# Patient Record
Sex: MALE | Race: WHITE | NOT HISPANIC OR LATINO | Employment: FULL TIME | URBAN - METROPOLITAN AREA
[De-identification: names, ages, dates, MRNs, and addresses within clinical notes are randomized per-mention and may not be internally consistent; named-entity substitution may affect disease eponyms.]

---

## 2018-06-07 DIAGNOSIS — R21 RASH AND NONSPECIFIC SKIN ERUPTION: ICD-10-CM

## 2018-06-07 RX ORDER — CLOTRIMAZOLE AND BETAMETHASONE DIPROPIONATE 10; .64 MG/G; MG/G
1 CREAM TOPICAL 2 TIMES DAILY
Qty: 1 TUBE | Refills: 0 | Status: SHIPPED | OUTPATIENT
Start: 2018-06-07

## 2018-06-07 NOTE — TELEPHONE ENCOUNTER
Was the patient seen in the last year in this department? No     Does patient have an active prescription for medications requested? No     Received Request Via: Patient     Reviewed with Dr. Tan. Refill ok, but pt must come in for follow up. Pt would like to stay with Dr. Tan at [facility]. Request for info be emailed. Will call back to schedule.

## 2022-06-30 ENCOUNTER — TELEPHONE (OUTPATIENT)
Dept: SCHEDULING | Facility: IMAGING CENTER | Age: 57
End: 2022-06-30

## 2022-07-11 ENCOUNTER — OFFICE VISIT (OUTPATIENT)
Dept: MEDICAL GROUP | Facility: PHYSICIAN GROUP | Age: 57
End: 2022-07-11
Payer: COMMERCIAL

## 2022-07-11 VITALS
WEIGHT: 163.6 LBS | BODY MASS INDEX: 23.42 KG/M2 | TEMPERATURE: 97.6 F | RESPIRATION RATE: 16 BRPM | HEIGHT: 70 IN | SYSTOLIC BLOOD PRESSURE: 133 MMHG | OXYGEN SATURATION: 96 % | HEART RATE: 68 BPM | DIASTOLIC BLOOD PRESSURE: 88 MMHG

## 2022-07-11 DIAGNOSIS — Z85.51 HX OF BLADDER CANCER: ICD-10-CM

## 2022-07-11 DIAGNOSIS — E55.9 VITAMIN D DEFICIENCY: ICD-10-CM

## 2022-07-11 DIAGNOSIS — Z00.00 PHYSICAL EXAM, ANNUAL: ICD-10-CM

## 2022-07-11 DIAGNOSIS — Z12.11 SCREENING FOR COLON CANCER: ICD-10-CM

## 2022-07-11 PROCEDURE — 99203 OFFICE O/P NEW LOW 30 MIN: CPT | Performed by: PHYSICIAN ASSISTANT

## 2022-07-11 RX ORDER — CHLORAL HYDRATE 500 MG
1000 CAPSULE ORAL
COMMUNITY

## 2022-07-11 ASSESSMENT — PATIENT HEALTH QUESTIONNAIRE - PHQ9: CLINICAL INTERPRETATION OF PHQ2 SCORE: 0

## 2022-07-11 NOTE — PROGRESS NOTES
CC:    Chief Complaint   Patient presents with   • Establish Care       HISTORY OF THE PRESENT ILLNESS: Patient is a 57 y.o. male presenting to establish primary care     1. Pt has hx of bladder CA. In remission. Still followed by urology.   2. Last colon CA screening was 6 years ago, FIT test.       No problem-specific Assessment & Plan notes found for this encounter.    Allergies: Patient has no known allergies.    Current Outpatient Medications Ordered in Epic   Medication Sig Dispense Refill   • Omega-3 Fatty Acids (FISH OIL) 1000 MG Cap capsule Take 1,000 mg by mouth 3 times a day with meals.     • Multiple Vitamin (MULTIVITAMIN ADULT PO) Take  by mouth.     • Cholecalciferol (VITAMIN D3 PO) Take  by mouth.     • clotrimazole-betamethasone (LOTRISONE) 1-0.05 % Cream Apply 1 Application to affected area(s) 2 times a day. (Patient not taking: Reported on 7/11/2022) 1 Tube 0     No current Epic-ordered facility-administered medications on file.       Past Medical History:   Diagnosis Date   • Cancer (HCC)     bladder- papillary urothelial ca       Past Surgical History:   Procedure Laterality Date   • CYSTOSCOPY  5/24/2016    Procedure: CYSTOSCOPY;  Surgeon: Nic Yo M.D.;  Location: SURGERY [facility];  Service:    • TRANS URETHRAL RESECTION BLADDER  5/24/2016    Procedure: TRANS URETHRAL RESECTION BLADDER BIPOLAR;  Surgeon: Nic Yo M.D.;  Location: SURGERY [facility];  Service:        Social History     Tobacco Use   • Smoking status: Never Smoker   • Smokeless tobacco: Never Used   Vaping Use   • Vaping Use: Never used   Substance Use Topics   • Alcohol use: Not Currently     Alcohol/week: 1.2 oz     Types: 2 Glasses of wine per week     Comment: OCC   • Drug use: No       Social History     Social History Narrative    Moved to [place] from [facility] -     He was born and raised in [place].      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  Played baseball in high school.   He's noticing that he's slowing down in terms of his physical activity..  Exercises 5-6 days per week.  Goes on lots of walks.    Has a dog.  Active at work.     Diet - he eats granola.  1 cup of coffee a day.  He likes OJ.  Eats more carbs - pasta and rice.  Chicken, on occasion red meats.  Salads and Carrots.  Tries to stay away sweets.      Etoh - glass or 2 of wine for dinner.  1-2 beers.  Not every day.       Family History   Problem Relation Age of Onset   • Diabetes Neg Hx    • Heart Disease Neg Hx    • Cancer Mother         leukemia   • Other Mother         cirrhosis of the liver   • Cancer Paternal Grandfather         pancreatic        ROS:     - Constitutional: Negative for fever, chills, unexpected weight change, and fatigue/generalized weakness.     - HEENT: Negative for headaches, vision changes, hearing changes, ear pain, ear discharge, rhinorrhea, sinus congestion, sore throat, and neck pain.      - Respiratory: Negative for cough, sputum production, chest congestion, dyspnea, wheezing, and crackles.      - Cardiovascular: Negative for chest pain, palpitations, orthopnea, and bilateral lower extremity edema.     - Gastrointestinal: Negative for heartburn, nausea, vomiting, abdominal pain, hematochezia, melena, diarrhea, constipation, and greasy/foul-smelling stools.     - Genitourinary: Negative for dysuria, polyuria, hematuria, pyuria, urinary urgency, and urinary incontinence.     - Musculoskeletal: Negative for myalgias, back pain, and joint pain.     - Skin: Negative for rash, itching, cyanotic skin color change.     - Neurological: Negative for dizziness, tingling, tremors, focal sensory deficit, focal weakness and headaches.     - Endo/Heme/Allergies: Does not bruise/bleed easily.     - Psychiatric/Behavioral: Negative for depression, suicidal/homicidal ideation and memory loss.            .      Exam: /88   Pulse 68   Temp  "36.4 °C (97.6 °F) (Temporal)   Resp 16   Ht 1.778 m (5' 10\")   Wt 74.2 kg (163 lb 9.6 oz)   SpO2 96%  Body mass index is 23.47 kg/m².    General: Normal appearing. No acute distress.  Skin: Warm and dry.  No obvious lesions.  HEENT: Normocephalic. Eyes conjunctiva clear lids without ptosis, ears normal shape and contour  Cardiovascular: Regular rate and rhythm without murmur.   Respiratory: Clear to auscultation bilaterally, no rhonchi wheezing or rales.  Neurologic: Grossly nonfocal, A&O x3, gait normal,  Musculoskeletal: No deformity or swelling.   Extremities: No extremity cyanosis, clubbing, or edema.  Psych: Normal mood and affect. Judgment and insight is normal.    Please note that this dictation was created using voice recognition software. I have made every reasonable attempt to correct obvious errors, but I expect that there are errors of grammar and possibly content that I did not discover before finalizing the note.      Assessment/Plan  1. Screening for colon cancer    - COLOGUARD (FIT DNA)    2. Physical exam, annual  Labs printed.   - CBC WITH DIFFERENTIAL; Future  - Comp Metabolic Panel; Future  - HEMOGLOBIN A1C; Future  - Lipid Profile; Future  - TSH WITH REFLEX TO FT4; Future    3. Hx of bladder cancer  Followed by urology    4. Vitamin D deficiency  Continue with Vit D     "

## 2022-08-01 LAB — HBA1C MFR BLD: 5.5 % (ref 0–5.6)

## 2022-08-15 ENCOUNTER — OFFICE VISIT (OUTPATIENT)
Dept: MEDICAL GROUP | Facility: PHYSICIAN GROUP | Age: 57
End: 2022-08-15
Payer: COMMERCIAL

## 2022-08-15 VITALS
HEIGHT: 70 IN | WEIGHT: 161.8 LBS | SYSTOLIC BLOOD PRESSURE: 128 MMHG | DIASTOLIC BLOOD PRESSURE: 78 MMHG | TEMPERATURE: 97.1 F | OXYGEN SATURATION: 96 % | RESPIRATION RATE: 16 BRPM | HEART RATE: 65 BPM | BODY MASS INDEX: 23.16 KG/M2

## 2022-08-15 DIAGNOSIS — Z00.00 PHYSICAL EXAM, ANNUAL: ICD-10-CM

## 2022-08-15 DIAGNOSIS — B35.4 TINEA CORPORIS: ICD-10-CM

## 2022-08-15 PROCEDURE — 99396 PREV VISIT EST AGE 40-64: CPT | Performed by: PHYSICIAN ASSISTANT

## 2022-08-15 RX ORDER — KETOCONAZOLE 20 MG/G
CREAM TOPICAL
Qty: 15 G | Refills: 0 | Status: SHIPPED | OUTPATIENT
Start: 2022-08-15 | End: 2023-04-24

## 2022-08-15 NOTE — PROGRESS NOTES
CC:    Chief Complaint   Patient presents with    Lab Results       HISTORY OF THE PRESENT ILLNESS:  57 y.o. male presenting for annual physical exam.   Pt's A1C at 5.5%.  Pt has rash over R lower leg for a few weeks.       No problem-specific Assessment & Plan notes found for this encounter.    Allergies: Patient has no known allergies.    Current Outpatient Medications Ordered in Epic   Medication Sig Dispense Refill    Omega-3 Fatty Acids (FISH OIL) 1000 MG Cap capsule Take 1,000 mg by mouth 3 times a day with meals.      Multiple Vitamin (MULTIVITAMIN ADULT PO) Take  by mouth.      Cholecalciferol (VITAMIN D3 PO) Take  by mouth.      clotrimazole-betamethasone (LOTRISONE) 1-0.05 % Cream Apply 1 Application to affected area(s) 2 times a day. (Patient not taking: No sig reported) 1 Tube 0     No current Epic-ordered facility-administered medications on file.       Past Medical History:   Diagnosis Date    Cancer (HCC)     bladder- papillary urothelial ca       Past Surgical History:   Procedure Laterality Date    CYSTOSCOPY  5/24/2016    Procedure: CYSTOSCOPY;  Surgeon: Nic Yo M.D.;  Location: SURGERY [facility];  Service:     TRANS URETHRAL RESECTION BLADDER  5/24/2016    Procedure: TRANS URETHRAL RESECTION BLADDER BIPOLAR;  Surgeon: Nic Yo M.D.;  Location: SURGERY [facility];  Service:        Social History     Tobacco Use    Smoking status: Never    Smokeless tobacco: Never   Vaping Use    Vaping Use: Never used   Substance Use Topics    Alcohol use: Not Currently     Alcohol/week: 1.2 oz     Types: 2 Glasses of wine per week     Comment: OCC    Drug use: No       Social History     Social History Narrative    Moved to [place] from [facility] -     He was born and raised in [place].      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  Played baseball in high school.   He's noticing that he's slowing down in terms  of his physical activity..  Exercises 5-6 days per week.  Goes on lots of walks.    Has a dog.  Active at work.     Diet - he eats granola.  1 cup of coffee a day.  He likes OJ.  Eats more carbs - pasta and rice.  Chicken, on occasion red meats.  Salads and Carrots.  Tries to stay away sweets.      Etoh - glass or 2 of wine for dinner.  1-2 beers.  Not every day.       Family History   Problem Relation Age of Onset    Cancer Mother         leukemia    Other Mother         cirrhosis of the liver    Cancer Paternal Grandfather         pancreatic     Diabetes Neg Hx     Heart Disease Neg Hx        ROS:     - Constitutional: Negative for fever, chills, unexpected weight change, and fatigue/generalized weakness.     - HEENT: Negative for headaches, vision changes, hearing changes, ear pain, ear discharge, rhinorrhea, sinus congestion, sore throat, and neck pain.      - Respiratory: Negative for cough, sputum production, chest congestion, dyspnea, wheezing, and crackles.      - Cardiovascular: Negative for chest pain, palpitations, orthopnea, and bilateral lower extremity edema.     - Gastrointestinal: Negative for heartburn, nausea, vomiting, abdominal pain, hematochezia, melena, diarrhea, constipation, and greasy/foul-smelling stools.     - Genitourinary: Negative for dysuria, polyuria, hematuria, pyuria, urinary urgency, and urinary incontinence.     - Musculoskeletal: Negative for myalgias, back pain, and joint pain.     - Skin:Positive for rash.  Negative for itching, cyanotic skin color change.     - Neurological: Negative for dizziness, tingling, tremors, focal sensory deficit, focal weakness and headaches.     - Endo/Heme/Allergies: Does not bruise/bleed easily.     - Psychiatric/Behavioral: Negative for depression, suicidal/homicidal ideation and memory loss.            Health Maintenance  Cholesterol Screening: Fasting lipid panel  Diabetes Screening: Fasting blood sugar  Substance Abuse: None  Safe in  "relationship Yes     Cancer screening  Colorectal Cancer Screening:  July 2022        Exam: /78   Pulse 65   Temp 36.2 °C (97.1 °F) (Temporal)   Resp 16   Ht 1.778 m (5' 10\")   Wt 73.4 kg (161 lb 12.8 oz)   SpO2 96%  Body mass index is 23.22 kg/m².    General: Normal appearing. No distress.  HEENT: Normocephalic. Eyes conjunctiva clear lids without ptosis, pupils equal and reactive to light accommodation, ears normal shape and contour, canals are clear bilaterally, tympanic membranes are benign, nasal mucosa benign, oropharynx is without erythema, edema or exudates.   Neck: Supple without JVD or bruit. No thyromegaly. No cervical lymphadenopathy  Pulmonary: Clear to ausculation.  Normal effort. No rales, ronchi, or wheezing.  Cardiovascular: Regular rate and rhythm without murmur, gallops or rubs  Neurologic: Grossly nonfocal, gait normal, normal muscle tone  Skin: Warm and dry.  Positive for annular rash over R lower leg with raised borders and central clearing  Musculoskeletal: No extremity cyanosis, clubbing, or edema. No deformity  Psych: Normal mood and affect. Alert and oriented x3. Judgment and insight is normal.    Please note that this dictation was created using voice recognition software. I have made every reasonable attempt to correct obvious errors, but I expect that there are errors of grammar and possibly content that I did not discover before finalizing the note.      Assessment/Plan  1. Physical exam, annual  PE conducted.   - CBC WITH DIFFERENTIAL; Future  - Comp Metabolic Panel; Future  - HEMOGLOBIN A1C; Future  - Lipid Profile; Future  - TSH WITH REFLEX TO FT4; Future    2. Tinea corporis  Return if not improving.   - ketoconazole (NIZORAL) 2 % Cream; Apply pea sized amount to affected area once daily  Dispense: 15 g; Refill: 0    Return in about 1 year (around 8/15/2023) for pe.    "

## 2023-04-22 DIAGNOSIS — B35.4 TINEA CORPORIS: ICD-10-CM

## 2023-04-24 RX ORDER — KETOCONAZOLE 20 MG/G
CREAM TOPICAL
Qty: 15 G | Refills: 0 | Status: SHIPPED | OUTPATIENT
Start: 2023-04-24

## 2023-09-11 ENCOUNTER — OFFICE VISIT (OUTPATIENT)
Dept: MEDICAL GROUP | Facility: PHYSICIAN GROUP | Age: 58
End: 2023-09-11
Payer: COMMERCIAL

## 2023-09-11 VITALS
WEIGHT: 165 LBS | BODY MASS INDEX: 23.62 KG/M2 | SYSTOLIC BLOOD PRESSURE: 110 MMHG | RESPIRATION RATE: 12 BRPM | TEMPERATURE: 97.4 F | HEIGHT: 70 IN | OXYGEN SATURATION: 96 % | HEART RATE: 69 BPM | DIASTOLIC BLOOD PRESSURE: 68 MMHG

## 2023-09-11 DIAGNOSIS — Z00.00 PHYSICAL EXAM, ANNUAL: ICD-10-CM

## 2023-09-11 DIAGNOSIS — Z11.59 ENCOUNTER FOR HEPATITIS C SCREENING TEST FOR LOW RISK PATIENT: ICD-10-CM

## 2023-09-11 DIAGNOSIS — L30.9 DERMATITIS: ICD-10-CM

## 2023-09-11 PROCEDURE — 3074F SYST BP LT 130 MM HG: CPT | Performed by: PHYSICIAN ASSISTANT

## 2023-09-11 PROCEDURE — 99396 PREV VISIT EST AGE 40-64: CPT | Performed by: PHYSICIAN ASSISTANT

## 2023-09-11 PROCEDURE — 3078F DIAST BP <80 MM HG: CPT | Performed by: PHYSICIAN ASSISTANT

## 2023-09-11 RX ORDER — TRIAMCINOLONE ACETONIDE 1 MG/G
CREAM TOPICAL
Qty: 30 G | Refills: 0 | Status: SHIPPED | OUTPATIENT
Start: 2023-09-11

## 2023-09-11 ASSESSMENT — PATIENT HEALTH QUESTIONNAIRE - PHQ9: CLINICAL INTERPRETATION OF PHQ2 SCORE: 0

## 2023-09-11 NOTE — PROGRESS NOTES
CC:    Chief Complaint   Patient presents with    Annual Exam       HISTORY OF THE PRESENT ILLNESS:  58 y.o. male presenting for annual physical exam.   Has an itchy rash over his L lateral ankle that has been present for about a month. Tried using on antifungal Rx but did not improve.       No problem-specific Assessment & Plan notes found for this encounter.    Allergies: Patient has no known allergies.    Current Outpatient Medications Ordered in Epic   Medication Sig Dispense Refill    ketoconazole (NIZORAL) 2 % Cream APPLY PEA SIZED AMOUNT TO AFFECTED AREA ONCE DAILY 15 g 0    Omega-3 Fatty Acids (FISH OIL) 1000 MG Cap capsule Take 1,000 mg by mouth 3 times a day with meals.      Multiple Vitamin (MULTIVITAMIN ADULT PO) Take  by mouth.      Cholecalciferol (VITAMIN D3 PO) Take  by mouth.      clotrimazole-betamethasone (LOTRISONE) 1-0.05 % Cream Apply 1 Application to affected area(s) 2 times a day. (Patient not taking: Reported on 7/11/2022) 1 Tube 0     No current Epic-ordered facility-administered medications on file.       Past Medical History:   Diagnosis Date    Cancer (HCC)     bladder- papillary urothelial ca       Past Surgical History:   Procedure Laterality Date    CYSTOSCOPY  5/24/2016    Procedure: CYSTOSCOPY;  Surgeon: Nic Yo M.D.;  Location: SURGERY [facility];  Service:     TRANS URETHRAL RESECTION BLADDER  5/24/2016    Procedure: TRANS URETHRAL RESECTION BLADDER BIPOLAR;  Surgeon: Nic Yo M.D.;  Location: SURGERY [facility];  Service:        Social History     Tobacco Use    Smoking status: Never    Smokeless tobacco: Never   Vaping Use    Vaping Use: Never used   Substance Use Topics    Alcohol use: Not Currently     Alcohol/week: 1.2 oz     Types: 2 Glasses of wine per week     Comment: OCC    Drug use: No       Social History     Social History Narrative    Moved to [place] from [facility] -     He was born and raised in [place].      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  Played baseball in high school.   He's noticing that he's slowing down in terms of his physical activity..  Exercises 5-6 days per week.  Goes on lots of walks.    Has a dog.  Active at work.     Diet - he eats granola.  1 cup of coffee a day.  He likes OJ.  Eats more carbs - pasta and rice.  Chicken, on occasion red meats.  Salads and Carrots.  Tries to stay away sweets.      Etoh - glass or 2 of wine for dinner.  1-2 beers.  Not every day.       Family History   Problem Relation Age of Onset    Cancer Mother         leukemia    Other Mother         cirrhosis of the liver    Cancer Paternal Grandfather         pancreatic     Diabetes Neg Hx     Heart Disease Neg Hx        ROS:     - Constitutional: Negative for fever, chills, unexpected weight change, and fatigue/generalized weakness.     - HEENT: Negative for headaches, vision changes, hearing changes, ear pain, ear discharge, rhinorrhea, sinus congestion, sore throat, and neck pain.      - Respiratory: Negative for cough, sputum production, chest congestion, dyspnea, wheezing, and crackles.      - Cardiovascular: Negative for chest pain, palpitations, orthopnea, and bilateral lower extremity edema.     - Gastrointestinal: Negative for heartburn, nausea, vomiting, abdominal pain, hematochezia, melena, diarrhea, constipation, and greasy/foul-smelling stools.     - Genitourinary: Negative for dysuria, polyuria, hematuria, pyuria, urinary urgency, and urinary incontinence.     - Musculoskeletal: Negative for myalgias, back pain, and joint pain.     - Skin:Positive for rash. Negative for cyanotic skin color change.     - Neurological: Negative for dizziness, tingling, tremors, focal sensory deficit, focal weakness and headaches.     - Endo/Heme/Allergies: Does not bruise/bleed easily.     - Psychiatric/Behavioral: Negative for depression, suicidal/homicidal ideation and  "memory loss.          Health Maintenance  Cholesterol Screening: Fasting lipid panel  Diabetes Screening: Fasting blood sugar  Exercise: Regular exercise.   Substance Abuse: None       Cancer screening  Colorectal Cancer Screening: cologuard 2022       Exam: /68   Pulse 69   Temp 36.3 °C (97.4 °F) (Temporal)   Resp 12   Ht 1.778 m (5' 10\")   Wt 74.8 kg (165 lb)   SpO2 96%  Body mass index is 23.68 kg/m².    General: Normal appearing. No distress.  HEENT: Normocephalic. Eyes conjunctiva clear lids without ptosis, pupils equal and reactive to light accommodation, ears normal shape and contour, canals are clear bilaterally, tympanic membranes are benign, nasal mucosa benign, oropharynx is without erythema, edema or exudates.   Neck: Supple without JVD or bruit. No thyromegaly. No cervical lymphadenopathy  Pulmonary: Clear to ausculation.  Normal effort. No rales, ronchi, or wheezing.  Cardiovascular: Regular rate and rhythm without murmur, gallops or rubs  Neurologic: Grossly nonfocal, gait normal, normal muscle tone  Skin: Warm and dry.  Positive for erythematous, excoriated patch over L lateral ankle.  Musculoskeletal: No extremity cyanosis, clubbing, or edema. No deformity  Psych: Normal mood and affect. Alert and oriented x3. Judgment and insight is normal.    Please note that this dictation was created using voice recognition software. I have made every reasonable attempt to correct obvious errors, but I expect that there are errors of grammar and possibly content that I did not discover before finalizing the note.      Assessment/Plan    1. Physical exam, annual  PE conducted  - CBC WITH DIFFERENTIAL; Future  - Comp Metabolic Panel; Future  - HEMOGLOBIN A1C; Future  - Lipid Profile; Future  - TSH WITH REFLEX TO FT4; Future    2. Encounter for hepatitis C screening test for low risk patient    - HEP C VIRUS ANTIBODY; Future    3. Dermatitis  Treat with steroid cream.  - triamcinolone acetonide " (KENALOG) 0.1 % Cream; Apply to affected area twice daily  Dispense: 30 g; Refill: 0

## 2024-05-24 DIAGNOSIS — L30.9 DERMATITIS: ICD-10-CM

## 2024-05-24 NOTE — TELEPHONE ENCOUNTER
Received request via: Patient    Was the patient seen in the last year in this department? Yes    Does the patient have an active prescription (recently filled or refills available) for medication(s) requested? No    Pharmacy Name: Pharmacy:   [facility][id number] - [address] [facility] [address]     Does the patient have correction Plus and need 100 day supply (blood pressure, diabetes and cholesterol meds only)? Patient does not have SCP

## 2024-05-28 RX ORDER — TRIAMCINOLONE ACETONIDE 1 MG/G
CREAM TOPICAL
Qty: 30 G | Refills: 0 | Status: SHIPPED | OUTPATIENT
Start: 2024-05-28